# Patient Record
(demographics unavailable — no encounter records)

---

## 2025-01-02 NOTE — DATA REVIEWED
[de-identified] : 12/30/24- CT Head, CTA Head/ Neck- IMPRESSION: HEAD CT: No evidence of an acute intracranial hemorhage, midline shift or hydrocephalus. NECK CTA: No hemodynamic significant narowing within the neck.  BRAIN CTA: No proximal large vessel occlusion. CT PERFUSION: No areas of ischemia identified.  12/30/24- Labs- ESR 4 CRP 8 A1C 5.5 ,
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
## 2025-01-02 NOTE — ASSESSMENT
[FreeTextEntry1] : Aaron Carbone is a 58 year old male with no significant past medical history possible ocular migraines here for hospital follow-up.  Presenting with R sided neck pain that radiates into R shoulder and up R side of head leading to dizziness and transient episode of blurry vision which lasted 10-15 minutes.  Transient visual disturbance is not new for him, experiencing similar episodes for the past 25+ years at one time dx with ocular migraine without head pain.  No recent injury, strenuous activity, or fall that could be contributing factor to new neck pain.  No fevers or body aches.  ESR/ CRP WNL.  CT Head without acute findings.  CTA with no hemodynamic narrowing or occlusion.  Suspect cervicogenic headache vs possible vascular etiology.  -Will obtain MRI brain and MRI cervical spine to further evaluate -BP goal <130/80 -continue naproxen PRN, will also prescribe cyclobenzaprine 5mg PRN to take for muscle spasm - - advised to monitor for potential SE's of drowsiness/ dizziness  - Physical therapy referral for neck pan -encouraged heat  -Discussed , pt is against statin medication, I have discussed alternative medication such as ezetimibe but wishes to not be on medication.  We discussed elevation of LDL above 100 increasing risk of cardiovascular events and stroke.     Follow up in 2 months

## 2025-01-02 NOTE — PHYSICAL EXAM
[FreeTextEntry1] : Mental status: Orientation: Alert , oriented to month, day of week, year, location Speech is fluent , able to name objects, repeat a sentence and write a sentence Memory: Short term tested by registering 3 objects and recalled 3/3 in 5 min; Long term memory intact based on correct recall of past events and demographic details. Concentration: able to do serial 7 calculation 5/5 and spell world backwards Comprehension : able follow 3 step requests Apraxia and visuospatial able to draw clock drawing and intersecting pentagon Neglect is absent Agnosia is absent  MMSE 30/30 Cranial nerves: CN I deferred. CN II VFF; ; III, IV, VI: PERRLA, EOM IV: Facial sensation normal B/L to touch, pinprick and temperature VII:Facial strength normal B/L. VIII: Gross hearing intact IX, X: palate midline and elevates symmetrically XI: Trapezius normal strength, XII: Tongue midline without atrophy or fasciculation Motor: Muscle tone no rigidity or resistance in all 4 extremities. No atrophy or fasciculation. Muscle strength: arms and legs, proximal and distal flexors and extensors are normal 5/5. No UE drift. Sensory: intact to pinprick, temperature sensation to vibration and cold.  Reflexes: all present, normal, and symmetrical 2+  Coordination: finger to nose: normal. Heel to shin: normal Gait: steady normal based ; Romberg test negative   limited lateral neck ROM when looking L elicits R sided neck pain

## 2025-01-02 NOTE — DATA REVIEWED
[de-identified] : 12/30/24- CT Head, CTA Head/ Neck- IMPRESSION: HEAD CT: No evidence of an acute intracranial hemorhage, midline shift or hydrocephalus. NECK CTA: No hemodynamic significant narowing within the neck.  BRAIN CTA: No proximal large vessel occlusion. CT PERFUSION: No areas of ischemia identified.  12/30/24- Labs- ESR 4 CRP 8 A1C 5.5 ,
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
## 2025-01-02 NOTE — PHYSICAL EXAM
[FreeTextEntry1] : Mental status: Orientation: Alert , oriented to month, day of week, year, location Speech is fluent , able to name objects, repeat a sentence and write a sentence Memory: Short term tested by registering 3 objects and recalled 3/3 in 5 min; Long term memory intact based on correct recall of past events and demographic details. Concentration: able to do serial 7 calculation 5/5 and spell world backwards Comprehension : able follow 3 step requests Apraxia and visuospatial able to draw clock drawing and intersecting pentagon Neglect is absent Agnosia is absent  MMSE 30/30 Cranial nerves: CN I deferred. CN II VFF; ; III, IV, VI: PERRLA, EOM IV: Facial sensation normal B/L to touch, pinprick and temperature VII:Facial strength normal B/L. VIII: Gross hearing intact IX, X: palate midline and elevates symmetrically XI: Trapezius normal strength, XII: Tongue midline without atrophy or fasciculation Motor: Muscle tone no rigidity or resistance in all 4 extremities. No atrophy or fasciculation. Muscle strength: arms and legs, proximal and distal flexors and extensors are normal 5/5. No UE drift. Sensory: intact to pinprick, temperature sensation to vibration and cold.  Reflexes: all present, normal, and symmetrical 2+  Coordination: finger to nose: normal. Heel to shin: normal Gait: steady normal based ; Romberg test negative   limited lateral neck ROM when looking L elicits R sided neck pain excision mass neck

## 2025-01-02 NOTE — DATA REVIEWED
[de-identified] : 12/30/24- CT Head, CTA Head/ Neck- IMPRESSION: HEAD CT: No evidence of an acute intracranial hemorhage, midline shift or hydrocephalus. NECK CTA: No hemodynamic significant narowing within the neck.  BRAIN CTA: No proximal large vessel occlusion. CT PERFUSION: No areas of ischemia identified.  12/30/24- Labs- ESR 4 CRP 8 A1C 5.5 ,